# Patient Record
(demographics unavailable — no encounter records)

---

## 2025-06-24 NOTE — CONSULT LETTER
[Dear  ___] : Dear  [unfilled], [Consult Letter:] : I had the pleasure of evaluating your patient, [unfilled]. [Please see my note below.] : Please see my note below. [Consult Closing:] : Thank you very much for allowing me to participate in the care of this patient.  If you have any questions, please do not hesitate to contact me. [Sincerely,] : Sincerely, [FreeTextEntry3] : Fabiola Silva MD [facility] physician partners Pediatric gastroenterologist , [facility] of medicine at [facility] [phone] [email or web address]  negative No oral lesions; no gross abnormalities

## 2025-06-24 NOTE — CONSULT LETTER
[Dear  ___] : Dear  [unfilled], [Consult Letter:] : I had the pleasure of evaluating your patient, [unfilled]. [Please see my note below.] : Please see my note below. [Consult Closing:] : Thank you very much for allowing me to participate in the care of this patient.  If you have any questions, please do not hesitate to contact me. [Sincerely,] : Sincerely, [FreeTextEntry3] : Fabiola Silva MD [facility] physician partners Pediatric gastroenterologist , [facility] of medicine at [facility] [phone] [email or web address]

## 2025-06-24 NOTE — HISTORY OF PRESENT ILLNESS
[de-identified] : Constipation and possible gluten sensitivity  referred by   SHELTON ANTON , is  a 2 yr old  here for  FU of consultation for constipation and possible gluten sensitivity.  Was seen by Dr. Belcher at [facility].  Is here for second opinion about if she has celiac disease or not  Review of labs is as follows: This was done at 1 year of age Deamidated gliadin IgA normal.  Deamidated gliadin peptide IgG 23.4.  IgA normal.  TTG IgA normal.  Blood normal.  CBC noted all normal white blood cell count, platelet count and hemoglobin count.  Ferritin normal.  TSH normal.  T4 normal.  Iron was 141 and high.  CMP noted calcium level 11.1 but otherwise normal.  GI panel done in May 2024 was normal.  Ova and parasites normal.  on gluten free diet. mostly abd pain seems to be related to  constipation.   gas ex seems helps  stools are mostly daily.  usually type III Uses 1 capful of MiraLAX once a week.  No blood or mucus in the stool.  No nausea or vomiting.  Growth has been appropriate.

## 2025-06-24 NOTE — REASON FOR VISIT
[Consultation Follow Up] : a consultation follow up  [Mother] : mother [FreeTextEntry2] : abdominal pain

## 2025-06-24 NOTE — ASSESSMENT
[Educated Patient & Family about Diagnosis] : educated the patient and family about the diagnosis [FreeTextEntry1] : 22-month-old here for abdominal pain, constipation and questionable celiac disease.  Currently on a gluten-free diet with improved abdominal discomfort.  Celiac panel noted a mildly elevated DGP at 1 year of age.  CBC CMP otherwise normal.  DGP IgA normal.  TTG IgA normal.  Continues on a gluten-free diet mostly  Differential diagnosis for constipation and abdominal pain include celiac versus constipation versus gluten sensitivity  Celiac HLA genetics pending.  Explained to mom that if the genetics noted a DQ 8 or DQ 2 positivity will likely need a gluten challenge at some point to see if she actually has celiac disease.  Explained to mom that under 2 celiac is harder to diagnose given that the sensitivity and specificity of the labs is not as accurate Follow-up labs of celiac HLA genetics  Continue intermittent gluten exposure  Follow-up in 3 months  I spent 30 mins face-to-face along with documentation, reviewing relevant imaging, labs, coordination of care

## 2025-06-24 NOTE — HISTORY OF PRESENT ILLNESS
[de-identified] : Constipation and possible gluten sensitivity  referred by   SHELTON ANTON , is  a 2 yr old  here for  FU of consultation for constipation and possible gluten sensitivity.  Was seen by Dr. Belcher at [facility].  Is here for second opinion about if she has celiac disease or not  Review of labs is as follows: This was done at 1 year of age Deamidated gliadin IgA normal.  Deamidated gliadin peptide IgG 23.4.  IgA normal.  TTG IgA normal.  Blood normal.  CBC noted all normal white blood cell count, platelet count and hemoglobin count.  Ferritin normal.  TSH normal.  T4 normal.  Iron was 141 and high.  CMP noted calcium level 11.1 but otherwise normal.  GI panel done in May 2024 was normal.  Ova and parasites normal.  on gluten free diet. mostly abd pain seems to be related to  constipation.   gas ex seems helps  stools are mostly daily.  usually type III Uses 1 capful of MiraLAX once a week.  No blood or mucus in the stool.  No nausea or vomiting.  Growth has been appropriate.